# Patient Record
Sex: FEMALE | Race: WHITE | NOT HISPANIC OR LATINO | Employment: FULL TIME | URBAN - METROPOLITAN AREA
[De-identification: names, ages, dates, MRNs, and addresses within clinical notes are randomized per-mention and may not be internally consistent; named-entity substitution may affect disease eponyms.]

---

## 2023-09-26 LAB
ALANINE AMINOTRANSFERASE (SGPT) (U/L) IN SER/PLAS: 11 U/L (ref 7–45)
ALBUMIN (G/DL) IN SER/PLAS: 4.2 G/DL (ref 3.4–5)
ALKALINE PHOSPHATASE (U/L) IN SER/PLAS: 63 U/L (ref 33–110)
ANION GAP IN SER/PLAS: 12 MMOL/L (ref 10–20)
ASPARTATE AMINOTRANSFERASE (SGOT) (U/L) IN SER/PLAS: 15 U/L (ref 9–39)
BASOPHILS (10*3/UL) IN BLOOD BY AUTOMATED COUNT: 0.04 X10E9/L (ref 0–0.1)
BASOPHILS/100 LEUKOCYTES IN BLOOD BY AUTOMATED COUNT: 0.6 % (ref 0–2)
BILIRUBIN TOTAL (MG/DL) IN SER/PLAS: 0.4 MG/DL (ref 0–1.2)
CALCIUM (MG/DL) IN SER/PLAS: 8.8 MG/DL (ref 8.6–10.3)
CARBON DIOXIDE, TOTAL (MMOL/L) IN SER/PLAS: 24 MMOL/L (ref 21–32)
CHLORIDE (MMOL/L) IN SER/PLAS: 105 MMOL/L (ref 98–107)
CHOLESTEROL (MG/DL) IN SER/PLAS: 242 MG/DL (ref 0–199)
CHOLESTEROL IN HDL (MG/DL) IN SER/PLAS: 47.3 MG/DL
CHOLESTEROL/HDL RATIO: 5.1
CREATININE (MG/DL) IN SER/PLAS: 0.75 MG/DL (ref 0.5–1.05)
EOSINOPHILS (10*3/UL) IN BLOOD BY AUTOMATED COUNT: 0.23 X10E9/L (ref 0–0.7)
EOSINOPHILS/100 LEUKOCYTES IN BLOOD BY AUTOMATED COUNT: 3.2 % (ref 0–6)
ERYTHROCYTE DISTRIBUTION WIDTH (RATIO) BY AUTOMATED COUNT: 12.9 % (ref 11.5–14.5)
ERYTHROCYTE MEAN CORPUSCULAR HEMOGLOBIN CONCENTRATION (G/DL) BY AUTOMATED: 31.1 G/DL (ref 32–36)
ERYTHROCYTE MEAN CORPUSCULAR VOLUME (FL) BY AUTOMATED COUNT: 95 FL (ref 80–100)
ERYTHROCYTES (10*6/UL) IN BLOOD BY AUTOMATED COUNT: 4.22 X10E12/L (ref 4–5.2)
GFR FEMALE: >90 ML/MIN/1.73M2
GLUCOSE (MG/DL) IN SER/PLAS: 97 MG/DL (ref 74–99)
HEMATOCRIT (%) IN BLOOD BY AUTOMATED COUNT: 39.9 % (ref 36–46)
HEMOGLOBIN (G/DL) IN BLOOD: 12.4 G/DL (ref 12–16)
IMMATURE GRANULOCYTES/100 LEUKOCYTES IN BLOOD BY AUTOMATED COUNT: 0.1 % (ref 0–0.9)
LDL: 152 MG/DL (ref 0–99)
LEUKOCYTES (10*3/UL) IN BLOOD BY AUTOMATED COUNT: 7.2 X10E9/L (ref 4.4–11.3)
LYMPHOCYTES (10*3/UL) IN BLOOD BY AUTOMATED COUNT: 2.21 X10E9/L (ref 1.2–4.8)
LYMPHOCYTES/100 LEUKOCYTES IN BLOOD BY AUTOMATED COUNT: 30.6 % (ref 13–44)
MONOCYTES (10*3/UL) IN BLOOD BY AUTOMATED COUNT: 0.5 X10E9/L (ref 0.1–1)
MONOCYTES/100 LEUKOCYTES IN BLOOD BY AUTOMATED COUNT: 6.9 % (ref 2–10)
NEUTROPHILS (10*3/UL) IN BLOOD BY AUTOMATED COUNT: 4.24 X10E9/L (ref 1.2–7.7)
NEUTROPHILS/100 LEUKOCYTES IN BLOOD BY AUTOMATED COUNT: 58.6 % (ref 40–80)
NON HDL CHOLESTEROL: 195 MG/DL
PLATELETS (10*3/UL) IN BLOOD AUTOMATED COUNT: 216 X10E9/L (ref 150–450)
POTASSIUM (MMOL/L) IN SER/PLAS: 4.1 MMOL/L (ref 3.5–5.3)
PROTEIN TOTAL: 6.9 G/DL (ref 6.4–8.2)
SODIUM (MMOL/L) IN SER/PLAS: 137 MMOL/L (ref 136–145)
THYROTROPIN (MIU/L) IN SER/PLAS BY DETECTION LIMIT <= 0.05 MIU/L: 3.66 MIU/L (ref 0.44–3.98)
TRIGLYCERIDE (MG/DL) IN SER/PLAS: 213 MG/DL (ref 0–149)
UREA NITROGEN (MG/DL) IN SER/PLAS: 8 MG/DL (ref 6–23)
VLDL: 43 MG/DL (ref 0–40)

## 2023-10-09 ENCOUNTER — TELEPHONE (OUTPATIENT)
Dept: PRIMARY CARE | Facility: CLINIC | Age: 33
End: 2023-10-09

## 2023-10-09 DIAGNOSIS — L70.9 ACNE, UNSPECIFIED ACNE TYPE: Primary | ICD-10-CM

## 2023-10-09 RX ORDER — TRETINOIN 0.25 MG/G
1 CREAM TOPICAL NIGHTLY
Qty: 45 G | Refills: 1 | Status: SHIPPED | OUTPATIENT
Start: 2023-10-09

## 2023-10-09 RX ORDER — TRETINOIN 0.25 MG/G
1 CREAM TOPICAL NIGHTLY
COMMUNITY
End: 2023-10-09 | Stop reason: SDUPTHER

## 2023-10-29 DIAGNOSIS — E03.9 HYPOTHYROIDISM, UNSPECIFIED TYPE: Primary | ICD-10-CM

## 2023-10-31 RX ORDER — LEVOTHYROXINE SODIUM 50 UG/1
50 TABLET ORAL DAILY
Qty: 90 TABLET | Refills: 3 | Status: SHIPPED | OUTPATIENT
Start: 2023-10-31

## 2024-01-22 ENCOUNTER — TELEMEDICINE (OUTPATIENT)
Dept: PRIMARY CARE | Facility: CLINIC | Age: 34
End: 2024-01-22
Payer: COMMERCIAL

## 2024-01-22 DIAGNOSIS — J01.40 ACUTE PANSINUSITIS, RECURRENCE NOT SPECIFIED: Primary | ICD-10-CM

## 2024-01-22 PROCEDURE — 99213 OFFICE O/P EST LOW 20 MIN: CPT

## 2024-01-22 RX ORDER — AMOXICILLIN AND CLAVULANATE POTASSIUM 875; 125 MG/1; MG/1
875 TABLET, FILM COATED ORAL 2 TIMES DAILY
Qty: 20 TABLET | Refills: 0 | Status: SHIPPED | OUTPATIENT
Start: 2024-01-22 | End: 2024-02-01

## 2024-01-22 ASSESSMENT — ENCOUNTER SYMPTOMS
FATIGUE: 1
SINUS PRESSURE: 1
SORE THROAT: 1

## 2024-01-22 NOTE — PROGRESS NOTES
Subjective   Patient ID: Vanita Padilla is a 33 y.o. female who presents for cold symptoms for 2 weeks.    HPI   With patient's permission, this is a Telemedicine visit with video and audio. Provider located at office address. Patient located at their home address. All issues as documented below were discussed and addressed but limited physical exam was performed. If it was felt that the patient should be evaluated via face-to-face office appointment(s) they were directed to appropriate location.     Vanita is seen today for c/o congestion, cough, sore throat secondary to PND x2 weeks. Has been taking Mucinex, symptomatic care. Tolerating fluids.  is sick. Former smoker, no history asthma. Denies chest pain, shortness of breath, fever, chills, nausea, vomiting.    Review of Systems   Constitutional:  Positive for fatigue.   HENT:  Positive for congestion, postnasal drip, sinus pressure and sore throat.    All other systems reviewed and are negative.    Objective   There were no vitals taken for this visit.    Physical Exam  Limited due to virtual encounter.   General:  Appears alert and oriented and well-nourished with no signs of acute distress.   Face:  Normal appearing with no obvious rash or neurological deficits.  Eyes:  No conjunctival inflammation or scleral icterus.  EOMI x2.  Respiratory:  Breathing is non-labored.   Psychiatric:  Affect is appropriate and shows good judgment.     Assessment/Plan   Problem List Items Addressed This Visit    None  Visit Diagnoses         Codes    Acute pansinusitis, recurrence not specified    -  Primary  Augmentin as directed. Risks and benefits of medication discussed and prescribed.   Continue OTC medications as directed for congestion, aches. Flonase as directed for congestion. Increase fluids, rest.   Follow up with PCP/UC if no improvement in 7-10 days, or sooner for worsening.  J01.40    Relevant Medications    amoxicillin-pot clavulanate (Augmentin) 875-125 mg  tablet

## 2024-03-27 ENCOUNTER — APPOINTMENT (OUTPATIENT)
Dept: DERMATOLOGY | Facility: CLINIC | Age: 34
End: 2024-03-27
Payer: COMMERCIAL

## 2024-04-02 PROBLEM — M54.2 NECK PAIN: Status: ACTIVE | Noted: 2024-04-02

## 2024-04-02 PROBLEM — E66.3 OVERWEIGHT (BMI 25.0-29.9): Status: ACTIVE | Noted: 2024-04-02

## 2024-04-02 PROBLEM — M25.539 PAIN IN WRIST: Status: ACTIVE | Noted: 2024-04-02

## 2024-04-02 PROBLEM — R53.83 FATIGUE: Status: ACTIVE | Noted: 2024-04-02

## 2024-04-02 PROBLEM — E03.9 HYPOTHYROIDISM: Status: ACTIVE | Noted: 2024-04-02

## 2024-04-02 PROBLEM — E78.5 HYPERLIPIDEMIA: Status: ACTIVE | Noted: 2024-04-02

## 2024-04-02 PROBLEM — L70.9 ACNE: Status: ACTIVE | Noted: 2024-04-02

## 2024-04-02 PROBLEM — E53.8 VITAMIN B12 DEFICIENCY: Status: ACTIVE | Noted: 2024-04-02

## 2024-04-02 RX ORDER — LANOLIN ALCOHOL/MO/W.PET/CERES
1 CREAM (GRAM) TOPICAL DAILY
COMMUNITY

## 2024-04-02 RX ORDER — CHOLECALCIFEROL (VITAMIN D3) 25 MCG
1 TABLET ORAL DAILY
COMMUNITY

## 2024-04-04 ENCOUNTER — APPOINTMENT (OUTPATIENT)
Dept: DERMATOLOGY | Facility: CLINIC | Age: 34
End: 2024-04-04
Payer: COMMERCIAL

## 2024-04-29 ASSESSMENT — DERMATOLOGY QUALITY OF LIFE (QOL) ASSESSMENT
RATE HOW BOTHERED YOU ARE BY SYMPTOMS OF YOUR SKIN PROBLEM (EG, ITCHING, STINGING BURNING, HURTING OR SKIN IRRITATION): 1
DATE THE QUALITY-OF-LIFE ASSESSMENT WAS COMPLETED: 66959
DATE THE QUALITY-OF-LIFE ASSESSMENT WAS COMPLETED: 04/29/2024
ARE THERE EXCLUSIONS OR EXCEPTIONS FOR THE QUALITY OF LIFE ASSESSMENT: NO
RATE HOW EMOTIONALLY BOTHERED YOU ARE BY YOUR SKIN PROBLEM (FOR EXAMPLE, WORRY, EMBARRASSMENT, FRUSTRATION): 3
RATE HOW BOTHERED YOU ARE BY EFFECTS OF YOUR SKIN PROBLEMS ON YOUR ACTIVITIES (EG, GOING OUT, ACCOMPLISHING WHAT YOU WANT, WORK ACTIVITIES OR YOUR RELATIONSHIPS WITH OTHERS): 1
RATE HOW BOTHERED YOU ARE BY EFFECTS OF YOUR SKIN PROBLEMS ON YOUR ACTIVITIES (EG, GOING OUT, ACCOMPLISHING WHAT YOU WANT, WORK ACTIVITIES OR YOUR RELATIONSHIPS WITH OTHERS): 1
WHAT SINGLE SKIN CONDITION LISTED BELOW IS THE PATIENT ANSWERING THE QUALITY-OF-LIFE ASSESSMENT QUESTIONS ABOUT: ACNE
RATE HOW BOTHERED YOU ARE BY SYMPTOMS OF YOUR SKIN PROBLEM (EG, ITCHING, STINGING BURNING, HURTING OR SKIN IRRITATION): 1
RATE HOW EMOTIONALLY BOTHERED YOU ARE BY YOUR SKIN PROBLEM (FOR EXAMPLE, WORRY, EMBARRASSMENT, FRUSTRATION): 3
WHAT SINGLE SKIN CONDITION LISTED BELOW IS THE PATIENT ANSWERING THE QUALITY-OF-LIFE ASSESSMENT QUESTIONS ABOUT: ACNE

## 2024-05-01 ENCOUNTER — OFFICE VISIT (OUTPATIENT)
Dept: DERMATOLOGY | Facility: CLINIC | Age: 34
End: 2024-05-01
Payer: COMMERCIAL

## 2024-05-01 DIAGNOSIS — B07.8 OTHER VIRAL WARTS: ICD-10-CM

## 2024-05-01 DIAGNOSIS — D22.9 MULTIPLE BENIGN NEVI: Primary | ICD-10-CM

## 2024-05-01 DIAGNOSIS — Z12.83 SCREENING EXAM FOR SKIN CANCER: ICD-10-CM

## 2024-05-01 PROCEDURE — 99213 OFFICE O/P EST LOW 20 MIN: CPT | Performed by: STUDENT IN AN ORGANIZED HEALTH CARE EDUCATION/TRAINING PROGRAM
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
## 2024-07-03 ENCOUNTER — APPOINTMENT (OUTPATIENT)
Dept: DERMATOLOGY | Facility: CLINIC | Age: 34
End: 2024-07-03
Payer: COMMERCIAL

## 2024-07-03 DIAGNOSIS — B07.8 OTHER VIRAL WARTS: ICD-10-CM

## 2024-07-03 DIAGNOSIS — L70.0 ACNE VULGARIS: Primary | ICD-10-CM

## 2024-07-03 PROCEDURE — 99214 OFFICE O/P EST MOD 30 MIN: CPT | Performed by: STUDENT IN AN ORGANIZED HEALTH CARE EDUCATION/TRAINING PROGRAM

## 2024-07-03 PROCEDURE — 17110 DESTRUCTION B9 LES UP TO 14: CPT | Performed by: STUDENT IN AN ORGANIZED HEALTH CARE EDUCATION/TRAINING PROGRAM

## 2024-07-03 RX ORDER — SPIRONOLACTONE 25 MG/1
25 TABLET ORAL DAILY
Qty: 30 TABLET | Refills: 11 | Status: SHIPPED | OUTPATIENT
Start: 2024-07-03 | End: 2025-07-03

## 2024-07-03 ASSESSMENT — DERMATOLOGY PATIENT ASSESSMENT
DO YOU USE A TANNING BED: NO
WHAT TYPE OF BIRTH CONTROL: IUD
ARE YOU AN ORGAN TRANSPLANT RECIPIENT: NO
DO YOU HAVE IRREGULAR MENSTRUAL CYCLES: NO
ARE YOU ON BIRTH CONTROL: YES
ARE YOU TRYING TO GET PREGNANT: NO
WHERE ARE THESE NEW OR CHANGING LESIONS LOCATED: WART ON FOOT
HAVE YOU HAD OR DO YOU HAVE VASCULAR DISEASE: NO
DO YOU HAVE ANY NEW OR CHANGING LESIONS: YES
HAVE YOU HAD OR DO YOU HAVE A STAPH INFECTION: NO
DO YOU USE SUNSCREEN: DAILY

## 2024-07-03 ASSESSMENT — PATIENT GLOBAL ASSESSMENT (PGA): PATIENT GLOBAL ASSESSMENT: PATIENT GLOBAL ASSESSMENT:  1 - CLEAR

## 2024-07-03 ASSESSMENT — DERMATOLOGY QUALITY OF LIFE (QOL) ASSESSMENT
RATE HOW BOTHERED YOU ARE BY SYMPTOMS OF YOUR SKIN PROBLEM (EG, ITCHING, STINGING BURNING, HURTING OR SKIN IRRITATION): 0 - NEVER BOTHERED
RATE HOW EMOTIONALLY BOTHERED YOU ARE BY YOUR SKIN PROBLEM (FOR EXAMPLE, WORRY, EMBARRASSMENT, FRUSTRATION): 0 - NEVER BOTHERED
WHAT SINGLE SKIN CONDITION LISTED BELOW IS THE PATIENT ANSWERING THE QUALITY-OF-LIFE ASSESSMENT QUESTIONS ABOUT: NONE OF THE ABOVE
ARE THERE EXCLUSIONS OR EXCEPTIONS FOR THE QUALITY OF LIFE ASSESSMENT: NO
RATE HOW BOTHERED YOU ARE BY EFFECTS OF YOUR SKIN PROBLEMS ON YOUR ACTIVITIES (EG, GOING OUT, ACCOMPLISHING WHAT YOU WANT, WORK ACTIVITIES OR YOUR RELATIONSHIPS WITH OTHERS): 0 - NEVER BOTHERED
DATE THE QUALITY-OF-LIFE ASSESSMENT WAS COMPLETED: 67024

## 2024-07-03 NOTE — PROGRESS NOTES
Subjective     Vanita Padilla is a 33 y.o. female who presents for the following: Wart (On right thumb and bottom of right foot) and Acne. Notes history of wart on R thumb with some coloration but not papular, seems to be resolved. New wart on plantar surface of R foot. Mild pain with weight bearing but otherwise no other symptoms. 6 month history of cystic acne. Pt noted this started when started Mirena IUD but currently on copper IUD.    Review of Systems:  No other skin or systemic complaints other than what is documented elsewhere in the note.    The following portions of the chart were reviewed this encounter and updated as appropriate:          Skin Cancer History  No skin cancer on file.      Specialty Problems          Dermatology Problems    Acne        Objective   Well appearing patient in no apparent distress; mood and affect are within normal limits.    A focused skin examination was performed. All findings within normal limits unless otherwise noted below.    Assessment/Plan   1. Acne vulgaris  Head - Anterior (Face)  Scattered comedones and inflammatory papulopustules. On chin  Flaring/chronic    Rx spironolactone    Related Medications  spironolactone (Aldactone) 25 mg tablet  Take 1 tablet (25 mg) by mouth once daily.    She may or may not tolerate it well- she tried it previously with fatigue  She will try lower dose  Discussed dietary changes    Rx trifarotene    2. Other viral warts    Related Procedures  Follow Up In Dermatology - Established Patient    Wart treated with liquid nitrogen  Plan to try compound W (40% salicyclic acid at home)    R foot  Raised 0.5 cm papule at base of first and second toe likely plantar wart.  Freezing 2-3 cycles with scoring for treatment done today

## 2024-08-21 DIAGNOSIS — E03.9 HYPOTHYROIDISM, UNSPECIFIED TYPE: ICD-10-CM

## 2024-08-21 RX ORDER — LEVOTHYROXINE SODIUM 50 UG/1
50 TABLET ORAL DAILY
Qty: 90 TABLET | Refills: 3 | Status: SHIPPED | OUTPATIENT
Start: 2024-08-21

## 2024-09-19 PROBLEM — Z00.00 HEALTHCARE MAINTENANCE: Status: ACTIVE | Noted: 2024-09-19

## 2024-09-30 ENCOUNTER — HOSPITAL ENCOUNTER (OUTPATIENT)
Dept: RADIOLOGY | Facility: HOSPITAL | Age: 34
Discharge: HOME | End: 2024-09-30
Payer: COMMERCIAL

## 2024-09-30 ENCOUNTER — LAB (OUTPATIENT)
Dept: LAB | Facility: LAB | Age: 34
End: 2024-09-30
Payer: COMMERCIAL

## 2024-09-30 ENCOUNTER — APPOINTMENT (OUTPATIENT)
Dept: PRIMARY CARE | Facility: CLINIC | Age: 34
End: 2024-09-30
Payer: COMMERCIAL

## 2024-09-30 VITALS
TEMPERATURE: 97.2 F | SYSTOLIC BLOOD PRESSURE: 109 MMHG | BODY MASS INDEX: 27.47 KG/M2 | HEIGHT: 71 IN | WEIGHT: 196.2 LBS | DIASTOLIC BLOOD PRESSURE: 78 MMHG | HEART RATE: 79 BPM

## 2024-09-30 DIAGNOSIS — R53.83 FATIGUE, UNSPECIFIED TYPE: ICD-10-CM

## 2024-09-30 DIAGNOSIS — E66.3 OVERWEIGHT WITH BODY MASS INDEX (BMI) OF 27 TO 27.9 IN ADULT: ICD-10-CM

## 2024-09-30 DIAGNOSIS — L70.9 ACNE, UNSPECIFIED ACNE TYPE: ICD-10-CM

## 2024-09-30 DIAGNOSIS — M54.32 SCIATICA OF LEFT SIDE: ICD-10-CM

## 2024-09-30 DIAGNOSIS — E03.9 HYPOTHYROIDISM, UNSPECIFIED TYPE: ICD-10-CM

## 2024-09-30 DIAGNOSIS — E55.9 VITAMIN D DEFICIENCY: ICD-10-CM

## 2024-09-30 DIAGNOSIS — E78.2 MIXED HYPERLIPIDEMIA: ICD-10-CM

## 2024-09-30 DIAGNOSIS — Z00.00 HEALTHCARE MAINTENANCE: Primary | ICD-10-CM

## 2024-09-30 DIAGNOSIS — Z00.00 HEALTHCARE MAINTENANCE: ICD-10-CM

## 2024-09-30 LAB
25(OH)D3 SERPL-MCNC: 47 NG/ML (ref 30–100)
ALBUMIN SERPL BCP-MCNC: 4.5 G/DL (ref 3.4–5)
ALP SERPL-CCNC: 64 U/L (ref 33–110)
ALT SERPL W P-5'-P-CCNC: 10 U/L (ref 7–45)
ANION GAP SERPL CALC-SCNC: 12 MMOL/L (ref 10–20)
AST SERPL W P-5'-P-CCNC: 14 U/L (ref 9–39)
BASOPHILS # BLD AUTO: 0.06 X10*3/UL (ref 0–0.1)
BASOPHILS NFR BLD AUTO: 0.8 %
BILIRUB SERPL-MCNC: 0.4 MG/DL (ref 0–1.2)
BUN SERPL-MCNC: 7 MG/DL (ref 6–23)
CALCIUM SERPL-MCNC: 9.1 MG/DL (ref 8.6–10.3)
CHLORIDE SERPL-SCNC: 103 MMOL/L (ref 98–107)
CHOLEST SERPL-MCNC: 257 MG/DL (ref 0–199)
CHOLESTEROL/HDL RATIO: 5.1
CO2 SERPL-SCNC: 28 MMOL/L (ref 21–32)
CREAT SERPL-MCNC: 0.74 MG/DL (ref 0.5–1.05)
DHEA-S SERPL-MCNC: 176 UG/DL (ref 12–379)
EGFRCR SERPLBLD CKD-EPI 2021: >90 ML/MIN/1.73M*2
EOSINOPHIL # BLD AUTO: 0.29 X10*3/UL (ref 0–0.7)
EOSINOPHIL NFR BLD AUTO: 3.7 %
ERYTHROCYTE [DISTWIDTH] IN BLOOD BY AUTOMATED COUNT: 13.1 % (ref 11.5–14.5)
FSH SERPL-ACNC: 7.7 IU/L
GLUCOSE SERPL-MCNC: 92 MG/DL (ref 74–99)
HCT VFR BLD AUTO: 41.8 % (ref 36–46)
HDLC SERPL-MCNC: 50.3 MG/DL
HGB BLD-MCNC: 13.1 G/DL (ref 12–16)
IMM GRANULOCYTES # BLD AUTO: 0.02 X10*3/UL (ref 0–0.7)
IMM GRANULOCYTES NFR BLD AUTO: 0.3 % (ref 0–0.9)
LDLC SERPL CALC-MCNC: 149 MG/DL
LH SERPL-ACNC: 6 IU/L
LYMPHOCYTES # BLD AUTO: 2.68 X10*3/UL (ref 1.2–4.8)
LYMPHOCYTES NFR BLD AUTO: 33.9 %
MCH RBC QN AUTO: 28.9 PG (ref 26–34)
MCHC RBC AUTO-ENTMCNC: 31.3 G/DL (ref 32–36)
MCV RBC AUTO: 92 FL (ref 80–100)
MONOCYTES # BLD AUTO: 0.58 X10*3/UL (ref 0.1–1)
MONOCYTES NFR BLD AUTO: 7.3 %
NEUTROPHILS # BLD AUTO: 4.28 X10*3/UL (ref 1.2–7.7)
NEUTROPHILS NFR BLD AUTO: 54 %
NON HDL CHOLESTEROL: 207 MG/DL (ref 0–149)
NRBC BLD-RTO: 0 /100 WBCS (ref 0–0)
PLATELET # BLD AUTO: 238 X10*3/UL (ref 150–450)
POTASSIUM SERPL-SCNC: 4.1 MMOL/L (ref 3.5–5.3)
PROT SERPL-MCNC: 6.9 G/DL (ref 6.4–8.2)
RBC # BLD AUTO: 4.54 X10*6/UL (ref 4–5.2)
SODIUM SERPL-SCNC: 139 MMOL/L (ref 136–145)
TRIGL SERPL-MCNC: 291 MG/DL (ref 0–149)
TSH SERPL-ACNC: 3.33 MIU/L (ref 0.44–3.98)
VIT B12 SERPL-MCNC: 447 PG/ML (ref 211–911)
VLDL: 58 MG/DL (ref 0–40)
WBC # BLD AUTO: 7.9 X10*3/UL (ref 4.4–11.3)

## 2024-09-30 PROCEDURE — 82306 VITAMIN D 25 HYDROXY: CPT

## 2024-09-30 PROCEDURE — 85025 COMPLETE CBC W/AUTO DIFF WBC: CPT

## 2024-09-30 PROCEDURE — 3008F BODY MASS INDEX DOCD: CPT | Performed by: FAMILY MEDICINE

## 2024-09-30 PROCEDURE — 84402 ASSAY OF FREE TESTOSTERONE: CPT

## 2024-09-30 PROCEDURE — 72100 X-RAY EXAM L-S SPINE 2/3 VWS: CPT

## 2024-09-30 PROCEDURE — 84443 ASSAY THYROID STIM HORMONE: CPT

## 2024-09-30 PROCEDURE — 82607 VITAMIN B-12: CPT

## 2024-09-30 PROCEDURE — 72100 X-RAY EXAM L-S SPINE 2/3 VWS: CPT | Performed by: RADIOLOGY

## 2024-09-30 PROCEDURE — 99214 OFFICE O/P EST MOD 30 MIN: CPT | Performed by: FAMILY MEDICINE

## 2024-09-30 PROCEDURE — 36415 COLL VENOUS BLD VENIPUNCTURE: CPT

## 2024-09-30 PROCEDURE — 80053 COMPREHEN METABOLIC PANEL: CPT

## 2024-09-30 PROCEDURE — 82627 DEHYDROEPIANDROSTERONE: CPT

## 2024-09-30 PROCEDURE — 83001 ASSAY OF GONADOTROPIN (FSH): CPT

## 2024-09-30 PROCEDURE — 99395 PREV VISIT EST AGE 18-39: CPT | Performed by: FAMILY MEDICINE

## 2024-09-30 PROCEDURE — 83002 ASSAY OF GONADOTROPIN (LH): CPT

## 2024-09-30 PROCEDURE — 80061 LIPID PANEL: CPT

## 2024-09-30 PROCEDURE — 1036F TOBACCO NON-USER: CPT | Performed by: FAMILY MEDICINE

## 2024-09-30 ASSESSMENT — PATIENT HEALTH QUESTIONNAIRE - PHQ9
1. LITTLE INTEREST OR PLEASURE IN DOING THINGS: NOT AT ALL
SUM OF ALL RESPONSES TO PHQ9 QUESTIONS 1 AND 2: 0
2. FEELING DOWN, DEPRESSED OR HOPELESS: NOT AT ALL

## 2024-09-30 NOTE — ASSESSMENT & PLAN NOTE
Patient with acne lesions only under her left jawline.  Check hormone levels as ordered and recommend she follow-up with dermatology.

## 2024-09-30 NOTE — PROGRESS NOTES
Subjective   Patient ID: Vanita Padilla is a 34 y.o. female who presents for Annual Exam.  Patient presents today for physical and also has several other concerns.  She reports that she has been having tingling in her left lower extremity.  She states that she had had some pain in her lower back and buttock that radiated down the leg.  That is doing better but she still getting some tingling occasionally.  No bowel or bladder incontinence.  She states that she continues to work on eating healthy and exercising.  She denies any chest pain or shortness of breath or abdominal pain.  She feels that her thyroid overall is okay.  She continues with her Synthroid.  She has noticed acne under her left chin.  She has had issues with hormonal acne in the past but currently has a ParaGard IUD and is still having these issues.  She has seen dermatology and was on spironolactone but felt that it made her tired.  She would like to have her hormone levels checked.  She states that otherwise she is doing okay.  She denies any other concerns.  HPI  Social History     Socioeconomic History    Marital status:      Spouse name: Not on file    Number of children: Not on file    Years of education: Not on file    Highest education level: Not on file   Occupational History    Not on file   Tobacco Use    Smoking status: Former     Current packs/day: 0.00     Types: Cigarettes     Quit date: 2010     Years since quittin.7    Smokeless tobacco: Never   Substance and Sexual Activity    Alcohol use: Yes     Comment: twice a week    Drug use: Yes     Types: Marijuana    Sexual activity: Not on file   Other Topics Concern    Not on file   Social History Narrative    Not on file     Social Determinants of Health     Financial Resource Strain: Not on file   Food Insecurity: Not on file   Transportation Needs: Not on file   Physical Activity: Not on file   Stress: Not on file   Social Connections: Not on file   Intimate Partner  "Violence: Not on file   Housing Stability: Not on file     Current Outpatient Medications   Medication Sig Dispense Refill    cholecalciferol (Vitamin D-3) 25 MCG (1000 UT) tablet Take 1 tablet (25 mcg) by mouth once daily.      cyanocobalamin (Vitamin B-12) 1,000 mcg tablet Take 0.5 tablets (500 mcg) by mouth once daily.      levothyroxine (Synthroid, Levoxyl) 50 mcg tablet Take 1 tablet (50 mcg) by mouth once daily. 90 tablet 3    tretinoin (Retin-A) 0.025 % cream Apply 1 Application topically once daily at bedtime. Apply sparingly to affected area once daily at bedtime 45 g 1     No current facility-administered medications for this visit.     Family History   Problem Relation Name Age of Onset    Hypothyroidism Mother      Hyperlipidemia Father       Review of Systems  Immunization History   Administered Date(s) Administered    Flu vaccine (IIV4), preservative free *Check age/dose* 09/29/2020, 10/12/2021    Moderna SARS-CoV-2 Vaccination 11/17/2021    Pfizer Purple Cap SARS-CoV-2 04/01/2021, 04/23/2021    Tdap vaccine, age 7 year and older (BOOSTRIX, ADACEL) 03/03/2019       Review of Systems negative except as noted in HPI and Chief complaint.     Objective                 /78 (BP Location: Left arm, Patient Position: Sitting)   Pulse 79   Temp 36.2 °C (97.2 °F)   Ht 1.791 m (5' 10.5\")   Wt 89 kg (196 lb 3.2 oz)   BMI 27.75 kg/m²    Physical Exam  HENT:      Head: Normocephalic.      Right Ear: Tympanic membrane normal.      Left Ear: Tympanic membrane normal.      Mouth/Throat:      Mouth: Mucous membranes are moist.      Pharynx: Oropharynx is clear.   Eyes:      Extraocular Movements: Extraocular movements intact.      Conjunctiva/sclera: Conjunctivae normal.      Pupils: Pupils are equal, round, and reactive to light.   Cardiovascular:      Rate and Rhythm: Normal rate and regular rhythm.      Pulses: Normal pulses.   Pulmonary:      Effort: Pulmonary effort is normal.      Breath sounds: Normal " "breath sounds.   Chest:   Breasts:     Right: Normal. No mass, nipple discharge, skin change or tenderness.      Left: Normal. No mass, nipple discharge, skin change or tenderness.   Abdominal:      General: There is no distension.      Palpations: Abdomen is soft.      Tenderness: There is no abdominal tenderness.   Musculoskeletal:         General: Normal range of motion.      Cervical back: Neck supple.   Lymphadenopathy:      Cervical: No cervical adenopathy.   Skin:     General: Skin is warm and dry.   Neurological:      General: No focal deficit present.      Mental Status: She is alert.       No results found for this or any previous visit (from the past 96 hour(s)).  Lab Results   Component Value Date    WBC 7.2 09/26/2023    HGB 12.4 09/26/2023    HCT 39.9 09/26/2023    MCV 95 09/26/2023     09/26/2023     Lab Results   Component Value Date    GLUCOSE 97 09/26/2023    CALCIUM 8.8 09/26/2023     09/26/2023    K 4.1 09/26/2023    CO2 24 09/26/2023     09/26/2023    BUN 8 09/26/2023    CREATININE 0.75 09/26/2023     Lab Results   Component Value Date    CHOL 242 (H) 09/26/2023    CHOL 272 (H) 08/11/2022    CHOL 255 (H) 08/23/2021     Lab Results   Component Value Date    HDL 47.3 09/26/2023    HDL 49.0 08/11/2022    HDL 52.0 08/23/2021     No results found for: \"LDLCALC\"  Lab Results   Component Value Date    TRIG 213 (H) 09/26/2023    TRIG 179 (H) 08/11/2022    TRIG 163 (H) 08/23/2021     No components found for: \"CHOLHDL\"   Lab Results   Component Value Date    TSH 3.66 09/26/2023      No results found for: \"HGBA1C\"   No results found for: \"ALBUMINUR\"   Assessment/Plan   Problem List Items Addressed This Visit       Fatigue     Check blood work as ordered.         Relevant Orders    Comprehensive Metabolic Panel    CBC and Auto Differential    TSH with reflex to Free T4 if abnormal    Vitamin B12    Hyperlipidemia     Continue with healthy diet and exercise.  Check CMP and lipids.         " Overweight with body mass index (BMI) of 27 to 27.9 in adult    Acne     Patient with acne lesions only under her left jawline.  Check hormone levels as ordered and recommend she follow-up with dermatology.         Relevant Orders    Testosterone, total and free    Luteinizing hormone    FSH    DHEA-Sulfate    Hypothyroidism     Patient clinically euthyroid.  Check TSH.  Continue with Synthroid.         Healthcare maintenance - Primary     Continue with healthy diet and exercise.  Screening blood work ordered.  Pap smear up-to-date.  Immunizations up-to-date.         Relevant Orders    Lipid Panel    Sciatica of left side     Normal strength and reflexes on exam.  Will check x-ray of the lumbar spine and also proceed with physical therapy.  Follow-up if symptoms persist.         Relevant Orders    XR lumbar spine 2-3 views    Referral to Physical Therapy     Other Visit Diagnoses       Vitamin D deficiency        Relevant Orders    Vitamin D 25-Hydroxy,Total (for eval of Vitamin D levels)

## 2024-09-30 NOTE — ASSESSMENT & PLAN NOTE
Continue with healthy diet and exercise.  Screening blood work ordered.  Pap smear up-to-date.  Immunizations up-to-date.

## 2024-09-30 NOTE — ASSESSMENT & PLAN NOTE
Normal strength and reflexes on exam.  Will check x-ray of the lumbar spine and also proceed with physical therapy.  Follow-up if symptoms persist.

## 2024-10-04 LAB
TESTOSTERONE FREE (CHAN): 3 PG/ML (ref 0.1–6.4)
TESTOSTERONE,TOTAL,LC-MS/MS: 21 NG/DL (ref 2–45)

## 2024-10-07 ENCOUNTER — TELEPHONE (OUTPATIENT)
Dept: PRIMARY CARE | Facility: CLINIC | Age: 34
End: 2024-10-07
Payer: COMMERCIAL

## 2024-10-07 NOTE — TELEPHONE ENCOUNTER
----- Message from Martha Damian sent at 10/7/2024  7:35 AM EDT -----  Please advise patient that testosterone level is normal.

## 2024-11-08 ENCOUNTER — EVALUATION (OUTPATIENT)
Dept: PHYSICAL THERAPY | Facility: CLINIC | Age: 34
End: 2024-11-08
Payer: COMMERCIAL

## 2024-11-08 DIAGNOSIS — M54.32 SCIATICA OF LEFT SIDE: ICD-10-CM

## 2024-11-08 PROCEDURE — 97161 PT EVAL LOW COMPLEX 20 MIN: CPT | Mod: GP | Performed by: PHYSICAL THERAPIST

## 2024-11-08 PROCEDURE — 97110 THERAPEUTIC EXERCISES: CPT | Mod: GP | Performed by: PHYSICAL THERAPIST

## 2024-11-08 ASSESSMENT — ENCOUNTER SYMPTOMS
DEPRESSION: 0
OCCASIONAL FEELINGS OF UNSTEADINESS: 0
LOSS OF SENSATION IN FEET: 0

## 2024-11-08 NOTE — PROGRESS NOTES
Physical Therapy Evaluation and Treatment      Patient Name: Vanita Padilla  MRN: [id number]  Today's Date: 11/8/2024  Visit #1  Time Calculation  Start Time: 1020  Stop Time: 1120  Time Calculation (min): 60 min    Insurance:2024 10% COINS, 3200 DED, 6000 OOP MAX, VS MED NEC, 1 EVAL  DAYS, NO AUTH     Current Problem:    Better  1. Sciatica of left side  Referral to Physical Therapy        General  Reason for Referral: M54.32 (ICD-10-CM) - Sciatica of left side  Referred By: MD Erin Vasquez  [facility] (The score of 4 or more indicates an increased risk of falling): 0    Red Flags: Do you have any of the following? No  Fever/chills, unexplained weight changes, dizziness/fainting, unexplained change in bowel or bladder functions, unexplained malaise or muscle weakness, night pain/sweats, numbness or tingling    Relevant Medical History:  Medical history form reviewed and scanned into chart  Unremarkable    Relevant Tests/Imaging:  Xray lumbar spine: 1. Mild L5-S1 facet joint arthropathy. Otherwise unremarkable lumbar  spine radiographs.    Subjective     Chief Complaint: Patient presents to clinic with complaints of back and left buttock  with pins and needles in L leg.  Symptoms in back and buttock have improved but patient continues to have stiffness and numbness/tingling. Patient denies weakness or leg buckling. Reports that when she has pain in leg it usually radiates down the back but not past the knee.   Onset Date: 9/1  PILAR: Insidious, admits to long plane ride last august  Pain:  Location: low back and L LE  0/10 in back, worst in past week 5/10, best in past week 0/10  Description: stiffness in back, pins and needles posterior/lateral thigh  Aggravating Factors:  prolonged sitting, sedentary, morning worse  Relieving Factors:  sometimes sitting    Previous Interventions/Treatments: None    Prior Level of Function (PLOF)  Patient previously independent with all  ADLs  Exercise/Physical Activity: sedentary, work out (boxing class )  Work/School: works with Insurance, sitting most of the day, prolonged driving and occasional flying  Patients Living Environment: Reviewed and no concern  Disturbed sleep able to sleep at night but stiff in morning, Sleeping position side    Patient's Goal(s) for Therapy: improved posture, reduce pain, prevent future injury, increased active lifestyle    Objective     Cari Lumbar Exam  Postural Observation:  Sitting slumped  Standing rounded shoulders,  Change of position increased stiffness  Lateral shift none  Shift relevant no    Neurological  Motor deficit no loss  Reflexes intact  Sensory deficit none  Neurodynamic tests slump test (-) L , SLR L (-)  Quadrant (?) L mild increase in sx's,  R (-)  Movement loss:  Flexion > 75, symptoms no change  Extension 50, symptoms stiffness, increase in pins and needs and radiating pain   Side gliding R , symptoms no change  Side gliding L , symptoms no change  Pretest symptoms standing   FIS during test No Effect  Rep FIS during test No Effect,  EIS during test Centralizing  Rep EIS during test Centralizing,     Pretest symptoms in lying  GIDEON during test No Effect  Rep GIDEON during test No Effect,   EIL during test Decrease  Rep EIL during test Abolish ,centralizing    Outcome Measures:  Other Measures  Oswestry Disablity Index (SONAL): 4     Treatments:  Therapeutic Exercise (50527): 20 minutes  Prone lying   Prone press up  Extension at wall in standing    Skilled intervention utilized in the appropriate selection & application of above exercises.   Skilled cues for correct exercise technique and for understanding of sx's and determination for exercise progression     HEP: above exercises prescribed  Handout of exercise descriptions and images issued.  Patient demonstrated appropriate form & verbalized understanding of optimal technique for above exercises and benefit of exercises related to current  condition, POC, pathophysiology, and precautions.    Education  Education and discussion on home exercise program  Recommend limiting prolonged sitting without counter movement  Recommend increased attention to posture and body mechanics ( lumbar roll, standing work station, lap top stance)  Instructed to stop any exercises that causes sx's to radiate farther down leg    ASSESSMENT:   Patient is presenting today with low back pain and sciatic in L LE.  Examination findings are consistent with with lumbar derangement which reduces with extension based movements..   Patient will benefit from physical therapy services to improve listed impairments and return patient to prior level of function.     Initiated treatment today to address these impairments.    Patient with the following impairments: decreased muscle performance, decreased ROM, decreased activity tolerance, pain, participation restrictions, impaired balance/gait, and difficulty with ADL completion    Patient's response to session: Decreased pain, Increased ROM/joint mobility, and Increased knowledge and understanding    PLAN:    Goals:  Patient will improve Modified Oswestry Low Back Pain Disability Index score to meet minimal detectable change of improvement to improve performance of ADLs.    Patient will be independent with home exercise program for proper self-management of condition.    Patient will improve active range of motion in deficit areas for ADL completion.    Patient will improve strength in deficit areas so patient can work with less pain.    Patient will complete job duties, self care and leisure tasks  without pain.    Next visit treatment considerations:    Assess tolerance to extension based exercises  Assess tolerance for flexion  Progressing prone stabilization or neutral stabilization program based on sx's    Treatment/Interventions: Education/ Instruction, Electrical stimulation, Dry needling, Cryotherapy, Gait training, Hot pack,  Manual therapy, Mechanical traction, Neuromuscular re-education, Orthosis fit/ training, Prosthetic management/ training, Self care/ home management, Taping techniques, Therapeutic activities, Therapeutic exercises, Ultrasound  PT Plan: Skilled PT  PT Frequency: 1 time per week  Duration: 12 weeks  Onset Date: 09/01/24  Certification Period Start Date: 11/08/24  Certification Period End Date: 02/06/25  Rehab Potential: Excellent  Plan of Care Agreement: Patient    *Plan of care was developed with input and agreement by the patient

## 2024-11-15 ENCOUNTER — TREATMENT (OUTPATIENT)
Dept: PHYSICAL THERAPY | Facility: CLINIC | Age: 34
End: 2024-11-15
Payer: COMMERCIAL

## 2024-11-15 DIAGNOSIS — M54.32 SCIATICA OF LEFT SIDE: Primary | ICD-10-CM

## 2024-11-15 PROCEDURE — 97112 NEUROMUSCULAR REEDUCATION: CPT | Mod: GP | Performed by: PHYSICAL THERAPIST

## 2024-11-15 NOTE — PROGRESS NOTES
Physical Therapy Treatment      Patient Name: Vanita Padilla  MRN: [id number]  Today's Date: 11/15/2024  Visit #2   Start 1410  Stop 1500    Insurance:    Current Problem:   1. Sciatica of left side            SUBJECTIVE: Slight improvement.  Noticinge increased sitting tolerance. More aware of posture during the day. Taking breaks and walking more. Reports increased stiffness and pins and needles upon standing after sitting. Patient reports using lumbar roll and notices decreased pins and needs when using.      OBJECTIVE:  Pain: 0-2/10, pins and needles in left leg mostly lateral/posterior hip and thigh  Flexion: increased pins and needles   Extension: decreased pins and needles  Neurodynamic test: (+) Slump  L,       Treatments:  Neuro (10448): 45  minutes  Trunk extension attempted but increased pins and needles L hip  Prone cobra x10 3 sec  Prone press up x10 5 sec resolving pins and needs  Prone hip extension x10 resolved pins and needs  Prone UE and LE extension x10  Bridges x10 3 sec  Standing extension x10 5 sec no pins and needles  Side glide L (wall on right) no change in sx's  Supine sciatic nerve glides with ankle DF/PF x10     HEP ;   -Skilled intervention utilized in the appropriate selection & application of above exercises.   -Handout of exercise descriptions and images issued.  - instructed to continue with exetension protocol but added in sciatic nerve glides, advised on progressing slowly and not aggressively, just gentle lengthening.  -educated to try side glide if sx's do not resolve with extension or seem to get worse after trying less agressive extension movements.  -encouraged increased walking.     Education:   -Education and discussion on HEP and treatment regarding the benefits related to current condition, POC, pathophysiology, and precautions  -Recommend limiting prolonged sitting without counter movement  -Recommend increased attention to posture and body mechanics ( lumbar roll, standing  work station, lap top stance)  -Instructed to stop any exercises that causes sx's to radiate farther down leg     ASSESSMENT:  Findings consistent with with lumbar derangement which reduces with extension based movements.  Patient presents with improved sitting tolerance and skills to abolish pins and needs. At this time patient presents with flexion intolerance and still requires compliance and commitment to extension and steady awareness of posture and body mechanics during functional tasks.  Patient will benefit from physical therapy services to improve listed impairments and return patient to prior level of function.     Patient's response to session: Decreased pain, Increased ROM/joint mobility, Increase motor control, and Increased knowledge and understanding    PLAN:  Continue per POC.    Next visit treatment considerations:  Assess flexion tolerance   STM to paraspinals and trunk stabilizers

## 2024-11-26 ENCOUNTER — TREATMENT (OUTPATIENT)
Dept: PHYSICAL THERAPY | Facility: CLINIC | Age: 34
End: 2024-11-26
Payer: COMMERCIAL

## 2024-11-26 DIAGNOSIS — M54.32 SCIATICA OF LEFT SIDE: Primary | ICD-10-CM

## 2024-11-26 PROCEDURE — 97112 NEUROMUSCULAR REEDUCATION: CPT | Mod: GP | Performed by: PHYSICAL THERAPIST

## 2024-11-26 PROCEDURE — 97140 MANUAL THERAPY 1/> REGIONS: CPT | Mod: GP | Performed by: PHYSICAL THERAPIST

## 2024-11-26 NOTE — PROGRESS NOTES
Physical Therapy Treatment      Patient Name: Vanita Padilla  MRN: [id number]  Today's Date: 11/26/2024  Visit 3      Insurance:  2024 10% COINS, 3200 DED, 6000 OOP MAX, VS MED NEC, 1 EVAL  DAYS, NO AUTH   Current Problem:   1. Sciatica of left side            Precautions: none    SUBJECTIVE:   Patient reports improvement in both pain level and frequency and intensity of pins and needles. Patient reports increased awareness about posture and doing her best to avoid prolonged sitting. States mild sensation of pins and needles after sitting in car and waiting in waiting room.       OBJECTIVE:  Pain: 0/10 current, 2/10 past week  Tenderness: minimal tension and tenderness    Trunk ROM  Flexion > 75% no  pins and needles  Left side glide  no pins and needles  Extension: > 75% no pins and needles    Neurodynamic test: (+) Slump L decreased intensity from previous testing    Treatments:  Neuro (40915): 33  minutes    Side glide L (wall on right) no change in sx's  Standing extension x10 5 sec no pins and needles  Standing sciatic nerve glides with ball  Cat <> Cow x10  Wag tail x10   Multifidus x10 with ariex under knee  Child pose<> up dog x10   QL stretch both sides x3 10 20 sec followed by extension press ups  Bird dog x10 (progressed from static to dynamic)    Manual Therapy (99992): 12 minutes  STM, muscle stripping and trigger point release to B paraspinals.     HEP: bolded items added to HEP  -Skilled intervention utilized in the appropriate selection & application of above exercises.   -Handout of exercise descriptions and images issued.  - instructed to continue with exetension protocol and sciatic nerve glides, advised on just gentle lengthening.   -educated to try side glide if sx's do not resolve with extension or seem to get worse after trying less agressive extension movements.  -encouraged increased walking.     Education:   -Education and discussion on HEP and treatment regarding the benefits related  to current condition, POC, pathophysiology, and precautions  -Recommend limiting prolonged sitting without counter movement  -Recommend  continue with attention to posture and body mechanics -Instructed to stop any exercises that causes sx's to radiate farther down leg     ASSESSMENT:  Findings consistent with with lumbar derangement which reduces with extension based movements.  Patient presents with improved sitting tolerance and skills to abolish pins and needs. At this time patient presents with improved flexion tolerance with use of counter movements. Patient also presenting with improved neural glide and decreased pain and sensitivity with neural testing.   Patient will benefit from physical therapy services to improve listed impairments and return patient to prior level of function.     Patient's response to session: Decreased pain, Increased ROM/joint mobility, Increase motor control, and Increased knowledge and understanding    PLAN:  Continue per POC.    Next visit treatment considerations:  Assess flexion tolerance   Seated nerve glides   Lumbar stabilization in neutral, Pallofpress, planks, bridge progress, supine bike kicks,

## 2024-12-23 ENCOUNTER — TREATMENT (OUTPATIENT)
Dept: PHYSICAL THERAPY | Facility: CLINIC | Age: 34
End: 2024-12-23
Payer: COMMERCIAL

## 2024-12-23 DIAGNOSIS — M54.32 SCIATICA OF LEFT SIDE: Primary | ICD-10-CM

## 2024-12-23 PROCEDURE — 97110 THERAPEUTIC EXERCISES: CPT | Mod: GP | Performed by: PHYSICAL THERAPIST

## 2024-12-23 NOTE — PROGRESS NOTES
Physical Therapy Treatment      Patient Name: Vanita Padilla  MRN: [id number]  Today's Date: 12/23/2024  Visit 3      Insurance:  2024 10% COINS, 3200 DED, 6000 OOP MAX, VS MED NEC, 1 EVAL  DAYS, NO AUTH   Current Problem:   1. Sciatica of left side          Precautions: none    SUBJECTIVE:   Patient reports back has been feeling really good.  Reports able to tolerate sitting for extended periods of time. Planning to return to exercise class in new year.     OBJECTIVE:  Pain: 0/10 current no sx's in legs  Tenderness: minimal tension and tenderness    Trunk ROM  Flexion WNL  no Sx's  SB > 75% no sx's  ROT > 75% no sx's  Extension: > 75% no sx's     Neurodynamic test: SLR (-)    Treatments:  Neuro (41363): 4545 minutes    Press up x10  Cat <> Cow x10  Child pose<> up dog x10   Bird dog x10 (progressed from static to dynamic)  Plank followed by press up  Side plank followed by press up   Dead bugs modified followed by bridge counter movement  Dead bug with ball followed by bridges over ball  Pallof press in squat x10 each direction progressed to staggered position x10 in each direction    HEP: added supine dead bugs, pallof press, side plank and regular plank.   -Skilled intervention utilized in the appropriate selection & application of above exercises.   -Handout of exercise descriptions and images issued.    Education:   -Education and discussion on HEP and treatment regarding the benefits related to current condition, POC, pathophysiology, and precautions  -educated on importance of posture, body mechanics and counter movements when in less then ideal postures for prolonged periods of time    ASSESSMENT:  Patient no longer experiencing back pain or pins and needles. Patient no longer limited in sitting, standing, walking. Patient planning to return to prior exercise class in the new year. Patient feels comfortable to discharged to [facility] if no symptoms return after returning to exercise  class.    Dariana  PLAN:  DC  to indep HEP if patient does call in next 4 weeks

## 2025-08-04 DIAGNOSIS — L70.9 ACNE, UNSPECIFIED ACNE TYPE: ICD-10-CM

## 2025-08-04 RX ORDER — TRETINOIN 0.25 MG/G
1 CREAM TOPICAL NIGHTLY
Qty: 45 G | Refills: 0 | Status: SHIPPED | OUTPATIENT
Start: 2025-08-04

## 2025-10-13 ENCOUNTER — APPOINTMENT (OUTPATIENT)
Dept: PRIMARY CARE | Facility: CLINIC | Age: 35
End: 2025-10-13
Payer: COMMERCIAL